# Patient Record
Sex: MALE | Race: WHITE | NOT HISPANIC OR LATINO | Employment: FULL TIME | URBAN - METROPOLITAN AREA
[De-identification: names, ages, dates, MRNs, and addresses within clinical notes are randomized per-mention and may not be internally consistent; named-entity substitution may affect disease eponyms.]

---

## 2017-01-04 DIAGNOSIS — G25.81 RESTLESS LEGS SYNDROME (RLS): Primary | ICD-10-CM

## 2017-01-06 RX ORDER — PRAMIPEXOLE DIHYDROCHLORIDE 0.5 MG/1
TABLET ORAL
Qty: 30 TABLET | Refills: 0 | Status: SHIPPED | OUTPATIENT
Start: 2017-01-06 | End: 2017-01-12

## 2017-01-07 NOTE — TELEPHONE ENCOUNTER
MP,  Left  #2 for pt  See below messages  Pt has not responded to our outreach.  Please advise on refill; pended 1 month supply  Rozina AVERY RN     Bladder non-tender and non-distended. Urine clear yellow.

## 2017-01-12 ENCOUNTER — OFFICE VISIT (OUTPATIENT)
Dept: FAMILY MEDICINE | Facility: CLINIC | Age: 57
End: 2017-01-12
Payer: COMMERCIAL

## 2017-01-12 VITALS
TEMPERATURE: 96.6 F | BODY MASS INDEX: 27.5 KG/M2 | WEIGHT: 203 LBS | HEIGHT: 72 IN | DIASTOLIC BLOOD PRESSURE: 69 MMHG | HEART RATE: 69 BPM | SYSTOLIC BLOOD PRESSURE: 111 MMHG | OXYGEN SATURATION: 96 %

## 2017-01-12 DIAGNOSIS — Z23 NEED FOR PROPHYLACTIC VACCINATION AND INOCULATION AGAINST INFLUENZA: ICD-10-CM

## 2017-01-12 DIAGNOSIS — G25.81 RESTLESS LEGS SYNDROME (RLS): Primary | ICD-10-CM

## 2017-01-12 DIAGNOSIS — Z23 NEED FOR PROPHYLACTIC VACCINATION WITH TETANUS-DIPHTHERIA (TD): ICD-10-CM

## 2017-01-12 DIAGNOSIS — Z11.59 NEED FOR HEPATITIS C SCREENING TEST: ICD-10-CM

## 2017-01-12 PROCEDURE — 90686 IIV4 VACC NO PRSV 0.5 ML IM: CPT | Performed by: FAMILY MEDICINE

## 2017-01-12 PROCEDURE — 90472 IMMUNIZATION ADMIN EACH ADD: CPT | Performed by: FAMILY MEDICINE

## 2017-01-12 PROCEDURE — 90471 IMMUNIZATION ADMIN: CPT | Performed by: FAMILY MEDICINE

## 2017-01-12 PROCEDURE — 36415 COLL VENOUS BLD VENIPUNCTURE: CPT | Performed by: FAMILY MEDICINE

## 2017-01-12 PROCEDURE — 90715 TDAP VACCINE 7 YRS/> IM: CPT | Performed by: FAMILY MEDICINE

## 2017-01-12 PROCEDURE — 82728 ASSAY OF FERRITIN: CPT | Performed by: FAMILY MEDICINE

## 2017-01-12 PROCEDURE — 99213 OFFICE O/P EST LOW 20 MIN: CPT | Mod: 25 | Performed by: FAMILY MEDICINE

## 2017-01-12 PROCEDURE — 86803 HEPATITIS C AB TEST: CPT | Performed by: FAMILY MEDICINE

## 2017-01-12 RX ORDER — PRAMIPEXOLE DIHYDROCHLORIDE 0.5 MG/1
0.5 TABLET ORAL AT BEDTIME
Qty: 90 TABLET | Refills: 3 | Status: SHIPPED | OUTPATIENT
Start: 2017-01-12 | End: 2017-04-27

## 2017-01-12 NOTE — NURSING NOTE
Chief Complaint   Patient presents with     Recheck Medication     pramipexole      /69 mmHg  Pulse 69  Temp(Src) 96.6  F (35.9  C) (Oral)  Ht 6' (1.829 m)  Wt 203 lb (92.08 kg)  BMI 27.53 kg/m2  SpO2 96% Estimated body mass index is 27.53 kg/(m^2) as calculated from the following:    Height as of this encounter: 6' (1.829 m).    Weight as of this encounter: 203 lb (92.08 kg).  BP completed using cuff size: large       Health Maintenance due pending provider review:  NONE    n/a      Omaira Rivas CMA

## 2017-01-12 NOTE — NURSING NOTE
Screening Questionnaire for Adult Immunization    Are you sick today?   No   Do you have allergies to medications, food, a vaccine component or latex?   No   Have you ever had a serious reaction after receiving a vaccination?   No   Do you have a long-term health problem with heart disease, lung disease, asthma, kidney disease, metabolic disease (e.g. diabetes), anemia, or other blood disorder?   No   Do you have cancer, leukemia, HIV/AIDS, or any other immune system problem?   No   In the past 3 months, have you taken medications that affect  your immune system, such as prednisone, other steroids, or anticancer drugs; drugs for the treatment of rheumatoid arthritis, Crohn s disease, or psoriasis; or have you had radiation treatments?   No   Have you had a seizure, or a brain or other nervous system problem?   No   During the past year, have you received a transfusion of blood or blood     products, or been given immune (gamma) globulin or antiviral drug?   No   For women: Are you pregnant or is there a chance you could become        pregnant during the next month?   No   Have you received any vaccinations in the past 4 weeks?   No     Immunization questionnaire answers were all negative.      MNVFC doesn't apply on this patient     Patient instructed to remain in clinic for 20 minutes afterwards, and to report any adverse reaction to me immediately.       Screening performed by Omaira Rivas on 1/12/2017 at 3:32 PM.

## 2017-01-12 NOTE — PROGRESS NOTES
Injectable Influenza Immunization Documentation    1.  Is the person to be vaccinated sick today?  No    2. Does the person to be vaccinated have an allergy to eggs or to a component of the vaccine?  No    3. Has the person to be vaccinated today ever had a serious reaction to influenza vaccine in the past?  No    4. Has the person to be vaccinated ever had Guillain-[place] syndrome?  No     Form completed by Omaira Rivas CMA

## 2017-01-12 NOTE — PROGRESS NOTES
Subjective: Annual review of medications,takes all whole Mirapex now but it's still quite small dose but without it he has terrible restless leg syndrome. He's never been checked for ferritin and we talked about the potential connection and we'll check it today and if it's low will try to bring it up to about 50 and see what it does for his symptoms, did warn him about potentially getting too high a level. He needs a tetanus shot and a flu shot and hepatitis C testing. No other concerns. We talked about healthy diets.    Assessment and plan: Follow-up restless leg syndrome, will check and see if there is a low iron connection. If not, medication refilled for a year and he should be seen in 1 year by one of my partners.

## 2017-01-13 LAB
FERRITIN SERPL-MCNC: 106 NG/ML (ref 26–388)
HCV AB SERPL QL IA: NORMAL

## 2017-01-13 NOTE — PROGRESS NOTES
Quick Note:    You have plenty of iron-that isn't the cause of your restless legs. If it was under 50 we would wonder if it was a factor  ______

## 2017-01-30 RX ORDER — PRAMIPEXOLE DIHYDROCHLORIDE 0.5 MG/1
TABLET ORAL
Start: 2017-01-30

## 2017-01-30 NOTE — TELEPHONE ENCOUNTER
Pending Prescriptions:                       Disp   Refills    pramipexole (MIRAPEX) 0.5 MG tablet [Phar*30 tab*0            Sig: TAKE 1 TABLET BY MOUTH AT BEDTIME       Rx to early.

## 2017-04-27 ENCOUNTER — OFFICE VISIT (OUTPATIENT)
Dept: FAMILY MEDICINE | Facility: CLINIC | Age: 57
End: 2017-04-27
Payer: COMMERCIAL

## 2017-04-27 VITALS
HEIGHT: 72 IN | BODY MASS INDEX: 27.5 KG/M2 | WEIGHT: 203 LBS | SYSTOLIC BLOOD PRESSURE: 114 MMHG | TEMPERATURE: 97.2 F | DIASTOLIC BLOOD PRESSURE: 68 MMHG | OXYGEN SATURATION: 96 % | HEART RATE: 70 BPM

## 2017-04-27 DIAGNOSIS — G25.81 RESTLESS LEGS SYNDROME (RLS): Primary | ICD-10-CM

## 2017-04-27 DIAGNOSIS — K21.9 GASTROESOPHAGEAL REFLUX DISEASE WITHOUT ESOPHAGITIS: ICD-10-CM

## 2017-04-27 DIAGNOSIS — Z12.11 COLON CANCER SCREENING: ICD-10-CM

## 2017-04-27 DIAGNOSIS — R19.5 DARK STOOLS: ICD-10-CM

## 2017-04-27 PROCEDURE — 99214 OFFICE O/P EST MOD 30 MIN: CPT | Performed by: FAMILY MEDICINE

## 2017-04-27 RX ORDER — PRAMIPEXOLE DIHYDROCHLORIDE 1 MG/1
1 TABLET ORAL
Qty: 90 TABLET | Refills: 3 | Status: SHIPPED | OUTPATIENT
Start: 2017-04-27 | End: 2019-02-01

## 2017-04-27 NOTE — MR AVS SNAPSHOT
After Visit Summary   4/27/2017    Luis Felipe Weaver    MRN: [id number]           Patient Information     Date Of Birth          1960        Visit Information        Provider Department      4/27/2017 10:00 AM Maxi Navarrete MD [facility]        Today's Diagnoses     Restless legs syndrome (RLS)    -  1    Dark stools        Colon cancer screening        Gastroesophageal reflux disease without esophagitis           Follow-ups after your visit        Follow-up notes from your care team     Return in about 1 year (around 4/27/2018), or if symptoms worsen or fail to improve.      Future tests that were ordered for you today     Open Future Orders        Priority Expected Expires Ordered    Fecal colorectal cancer screen (FIT) Routine 5/18/2017 7/20/2017 4/27/2017            Who to contact     If you have questions or need follow up information about today's clinic visit or your schedule please contact [facility] directly at [phone].  Normal or non-critical lab and imaging results will be communicated to you by [employer], letter or phone within 4 business days after the clinic has received the results. If you do not hear from us within 7 days, please contact the clinic through [employer] or phone. If you have a critical or abnormal lab result, we will notify you by phone as soon as possible.  Submit refill requests through [employer] or call your pharmacy and they will forward the refill request to us. Please allow 3 business days for your refill to be completed.          Additional Information About Your Visit        MyChart Information     [employer] gives you secure access to your electronic health record. If you see a primary care provider, you can also send messages to your care team and make appointments. If you have questions, please call your primary care clinic.  If you do not have a primary care provider, please call [phone] and they will assist you.         Care EveryWhere ID     This is your Care EveryWhere ID. This could be used by other organizations to access your [facility] medical records  [id number]        Your Vitals Were     Pulse Temperature Height Pulse Oximetry BMI (Body Mass Index)       70 97.2  F (36.2  C) (Oral) 6' (1.829 m) 96% 27.53 kg/m2        Blood Pressure from Last 3 Encounters:   04/27/17 114/68   01/12/17 111/69   06/23/16 127/64    Weight from Last 3 Encounters:   04/27/17 203 lb (92.1 kg)   01/12/17 203 lb (92.1 kg)   06/23/16 198 lb (89.8 kg)                 Today's Medication Changes          These changes are accurate as of: 4/27/17 11:37 AM.  If you have any questions, ask your nurse or doctor.               These medicines have changed or have updated prescriptions.        Dose/Directions    pramipexole 1 MG tablet   Commonly known as:  MIRAPEX   This may have changed:    - medication strength  - how much to take  - when to take this  - reasons to take this   Used for:  Restless legs syndrome (RLS)   Changed by:  Maxi Navarrete MD        Dose:  1 mg   Take 1 tablet (1 mg) by mouth nightly as needed   Quantity:  90 tablet   Refills:  3            Where to get your medicines      These medications were sent to [email or web address] - [address]  [address], [place] [address]     Phone:  [phone]     pramipexole 1 MG tablet                Primary Care Provider Office Phone # Fax #    Herman Raymundo [phone]       [facility] [address] [address]        Thank you!     Thank you for choosing [facility]  for your care. Our goal is always to provide you with excellent care. Hearing back from our patients is one way we can continue to improve our services. Please take a few minutes to complete the written survey that you may receive in the mail after your visit with us. Thank you!             Your Updated  Medication List - Protect others around you: Learn how to safely use, store and throw away your medicines at www.disposemymeds.org.          This list is accurate as of: 4/27/17 11:37 AM.  Always use your most recent med list.                   Brand Name Dispense Instructions for use    pramipexole 1 MG tablet    MIRAPEX    90 tablet    Take 1 tablet (1 mg) by mouth nightly as needed       UNKNOWN MED DOSAGE      Topical psoriasis medication

## 2017-04-27 NOTE — PROGRESS NOTES
SUBJECTIVE:                                                    Luis Felipe Weaver is a 57 year old male who presents to clinic today for the following health issues:      Medication Followup of   pramipexole (MIRAPEX)     Taking Medication as prescribed: yes    Side Effects:  no    Medication Helping Symptoms:  NO- not helping sx as much as used to - would like discuss increase dosage      Patient has been taking this medication for restless leg syndrome for quite a while  He has been sometimes doubling up to 1 mg per evening and this seems to work quite a bit better  We discussed the potential side effects from using the higher doses of Mirapex but he doesn't seem to be having any of those  We also discussed using at 2-3 hours before symptom onset as the best way    He did have an evaluation to check his ferritin but this was normal    Gastrointestinal symptoms      Duration: ongoing    Description:           REFLUX SYMPTOMS - heartburn at night and dark stools     Intensity:  Severe - at times     Accompanying signs and symptoms:  none    History  Previous {similar problem: YES  Previous evaluation:  none    Aggravating factors: none, certain foods - garlic and red sauce, peanut butter      Alleviating factors: nothing    Other Therapies tried: antacid occ if sever - usually sits up in bed a and sx usually passed       He's also been eating beets and juice and wonders if that's a possibility  Patient has previously had normal colon cancer screening but it's been more than 5 years  We will do some screening with a fit test      ROS: As per HPI.  Constitutional: no recent illness, no fevers/sweats/chills  Neuro: no new headaches, dizziness, numbness, or tingling    I have reviewed and updated the patient's past medical history in the EMR. Current problems are:    Patient Active Problem List   Diagnosis     Restless legs syndrome (RLS)     GERD (gastroesophageal reflux disease)     Psoriasis     Narcolepsy without  cataplexy     Achalasia     Pain in thoracic spine     Past Surgical History:   Procedure Laterality Date     NO         Social History   Substance Use Topics     Smoking status: Never Smoker     Smokeless tobacco: Never Used     Alcohol use No     Family History   Problem Relation Age of Onset     DIABETES Mother      Hypertension Mother      Obesity Mother      C.A.D. No family hx of      Cancer - colorectal No family hx of          Allergies, reviewed:     Allergies   Allergen Reactions     Provigil [Modafinil] Rash       Current Outpatient Prescriptions   Medication Sig Dispense Refill     pramipexole (MIRAPEX) 1 MG tablet Take 1 tablet (1 mg) by mouth nightly as needed 90 tablet 3     [DISCONTINUED] pramipexole (MIRAPEX) 0.5 MG tablet Take 1 tablet (0.5 mg) by mouth At Bedtime 90 tablet 3     UNKNOWN MED DOSAGE Topical psoriasis medication         Objective:  /68  Pulse 70  Temp 97.2  F (36.2  C) (Oral)  Ht 6' (1.829 m)  Wt 203 lb (92.1 kg)  SpO2 96%  BMI 27.53 kg/m2  General Appearance: Pleasant, alert, WN/WD in no acute respiratory distress.  Neurologic Exam: Nonfocal, no tremor. Normal gait.  Psychiatric Exam: Alert - appropriate, normal affect    ASSESSMENT/PLAN:    ICD-10-CM    1. Restless legs syndrome (RLS) G25.81 pramipexole (MIRAPEX) 1 MG tablet   2. Dark stools R19.5    3. Colon cancer screening Z12.11 Fecal colorectal cancer screen (FIT)   4. Gastroesophageal reflux disease without esophagitis K21.9     increase Mirapex as noted above   Dark stools may be dietary  Fit test for colon cancer screening    The pathophysiology of reflux is discussed.  Anti-reflux measures such as raising the head of the bed, avoiding tight clothing or belts, avoiding eating late at night and not lying down shortly after mealtime and achieving weight loss are discussed.  If still concerned about the potential effects of omeprazole could try switching to H2 blockers, which work differently and don't have a  concern about kidney function  The most common brands of H2 blockers are Zantac, Pepcid, and Tagamet  However my experience is that they don't work quite as well    Follow up: Return as needed if symptoms fail to improve  Or in one year as needed for refill of restless leg syndrome medication    Maxi Navarrete MD MPH

## 2017-04-27 NOTE — NURSING NOTE
Chief Complaint   Patient presents with     RECHECK     Gastrophageal Reflux     /68  Pulse 70  Temp 97.2  F (36.2  C) (Oral)  Ht 6' (1.829 m)  Wt 203 lb (92.1 kg)  SpO2 96%  BMI 27.53 kg/m2 Estimated body mass index is 27.53 kg/(m^2) as calculated from the following:    Height as of this encounter: 6' (1.829 m).    Weight as of this encounter: 203 lb (92.1 kg).  BP completed using cuff size: regular       Health Maintenance due pending provider review:  NONE    n/a      Omaira Rivas CMA

## 2018-02-12 ENCOUNTER — OFFICE VISIT (OUTPATIENT)
Dept: FAMILY MEDICINE | Facility: CLINIC | Age: 58
End: 2018-02-12
Payer: COMMERCIAL

## 2018-02-12 VITALS
SYSTOLIC BLOOD PRESSURE: 104 MMHG | HEART RATE: 63 BPM | TEMPERATURE: 96.9 F | HEIGHT: 72 IN | WEIGHT: 206 LBS | DIASTOLIC BLOOD PRESSURE: 66 MMHG | OXYGEN SATURATION: 97 % | RESPIRATION RATE: 14 BRPM | BODY MASS INDEX: 27.9 KG/M2

## 2018-02-12 DIAGNOSIS — Z00.00 ROUTINE HISTORY AND PHYSICAL EXAMINATION OF ADULT: Primary | ICD-10-CM

## 2018-02-12 DIAGNOSIS — Z23 NEED FOR INFLUENZA VACCINATION: ICD-10-CM

## 2018-02-12 DIAGNOSIS — K22.0 ACHALASIA: ICD-10-CM

## 2018-02-12 DIAGNOSIS — G25.81 RESTLESS LEGS SYNDROME (RLS): ICD-10-CM

## 2018-02-12 DIAGNOSIS — K21.9 GASTROESOPHAGEAL REFLUX DISEASE WITHOUT ESOPHAGITIS: ICD-10-CM

## 2018-02-12 LAB
ERYTHROCYTE [DISTWIDTH] IN BLOOD BY AUTOMATED COUNT: 13 % (ref 10–15)
HCT VFR BLD AUTO: 43.9 % (ref 40–53)
HGB BLD-MCNC: 15 G/DL (ref 13.3–17.7)
MCH RBC QN AUTO: 30.4 PG (ref 26.5–33)
MCHC RBC AUTO-ENTMCNC: 34.2 G/DL (ref 31.5–36.5)
MCV RBC AUTO: 89 FL (ref 78–100)
PLATELET # BLD AUTO: 199 10E9/L (ref 150–450)
RBC # BLD AUTO: 4.93 10E12/L (ref 4.4–5.9)
WBC # BLD AUTO: 6 10E9/L (ref 4–11)

## 2018-02-12 PROCEDURE — 99396 PREV VISIT EST AGE 40-64: CPT | Mod: 25 | Performed by: FAMILY MEDICINE

## 2018-02-12 PROCEDURE — 82728 ASSAY OF FERRITIN: CPT | Performed by: FAMILY MEDICINE

## 2018-02-12 PROCEDURE — 90471 IMMUNIZATION ADMIN: CPT | Performed by: FAMILY MEDICINE

## 2018-02-12 PROCEDURE — 36415 COLL VENOUS BLD VENIPUNCTURE: CPT | Performed by: FAMILY MEDICINE

## 2018-02-12 PROCEDURE — 80061 LIPID PANEL: CPT | Performed by: FAMILY MEDICINE

## 2018-02-12 PROCEDURE — 90686 IIV4 VACC NO PRSV 0.5 ML IM: CPT | Performed by: FAMILY MEDICINE

## 2018-02-12 PROCEDURE — 85027 COMPLETE CBC AUTOMATED: CPT | Performed by: FAMILY MEDICINE

## 2018-02-12 PROCEDURE — 99213 OFFICE O/P EST LOW 20 MIN: CPT | Mod: 25 | Performed by: FAMILY MEDICINE

## 2018-02-12 RX ORDER — ROPINIROLE 0.5 MG/1
0.5 TABLET, FILM COATED ORAL AT BEDTIME
Qty: 90 TABLET | Refills: 3 | Status: SHIPPED | OUTPATIENT
Start: 2018-02-12 | End: 2018-03-02

## 2018-02-12 NOTE — NURSING NOTE
Chief Complaint   Patient presents with     Physical       Initial /66  Pulse 63  Temp 96.9  F (36.1  C) (Tympanic)  Resp 14  Ht 6' (1.829 m)  Wt 206 lb (93.4 kg)  SpO2 97%  BMI 27.94 kg/m2 Estimated body mass index is 27.94 kg/(m^2) as calculated from the following:    Height as of this encounter: 6' (1.829 m).    Weight as of this encounter: 206 lb (93.4 kg).  BP completed using cuff size: large    Health Maintenance that is potentially due pending provider review:  Health Maintenance Due   Topic Date Due     ADVANCE DIRECTIVE PLANNING Q5 YRS  01/06/2017     INFLUENZA VACCINE (SYSTEM ASSIGNED)  09/01/2017         Flu vaccine ordered

## 2018-02-12 NOTE — PROGRESS NOTES
SUBJECTIVE:   CC: Luis Felipe Weaver is an 58 year old male who presents for preventative health visit.     Physical   Annual:     Getting at least 3 servings of Calcium per day::  Yes    Bi-annual eye exam::  Yes    Dental care twice a year::  Yes    Sleep apnea or symptoms of sleep apnea::  Daytime drowsiness and Excessive snoring    Diet::  Regular (no restrictions)    Frequency of exercise::  None    Taking medications regularly::  No    Barriers to taking medications::  Side effects    Medication side effects::  Other    Additional concerns today::  YES              in addition to health maintanance patient would like to discuss the following problem:    Recently has been having more Heartburn, Difficulty swallowing, over the last couple years.   fullness, belching, burning after heavier meals, especially if lying down shortly after eating. + dysphagia.     Exam shows no abdominal tenderness or mass. This is intermittent GERD. The pathophysiology of reflux is discussed; anti-reflux measures such as raising the head of the bed, and avoiding lying down after meals are suggested.     Try to avoid ASA, NSAID's, caffeine, peppermints, alcohol and tobacco. OTC   PPI and H2 blockers and/or antacids are often very helpful for PRN use.     Because of the persistent dysphagia I recommend that patient get an upper GI endoscopy for evaluation    Also a persistent cough.   Went on a camping trip around [facility] and started getting coughing with deep breathing from that, but it seems to be better now    Exam shows normal ENT, normal chest. This is likely a post viral cough. Asthma or reflux are a consideration if symptoms persist. I reviewed the 5 most common causes of chronic cough and their treatments    We also discussed restless legs patient previously was taking Mirapex 1 mg but finds that he has some stomach side effects with this  Takes it about 1 hour before bedtime  He has no trouble falling asleep but he wakes  up with leg symptoms  He wonders if another drug would be better  He is already tried gabapentin and this was ineffective     Review of systems no significant fatigue or anemia  Reviewed previous labs showing normal ferritin    Problem pertinent exam negative for neurologic signs and symptoms    Discussed taking Mirapex earlier in the evening up to 2-3 hours before bedtime to see if this helps with the gastrointestinal side effects  Otherwise also a trial of Requip as an alternative    Follow up: Return as needed if symptoms fail to improve        We also discussed health maintenance, health care directive, flu shot.      Today's PHQ-2 Score:   PHQ-2 ( 1999 Pfizer) 2/8/2018   Q1: Little interest or pleasure in doing things 1   Q2: Feeling down, depressed or hopeless 1   PHQ-2 Score 2   Q1: Little interest or pleasure in doing things Several days   Q2: Feeling down, depressed or hopeless Several days   PHQ-2 Score 2       Abuse: Current or Past(Physical, Sexual or Emotional)- No  Do you feel safe in your environment - Yes    Social History   Substance Use Topics     Smoking status: Never Smoker     Smokeless tobacco: Never Used     Alcohol use No     Alcohol Use 2/8/2018   If you drink alcohol, do you typically have greater than 3 drinks per day OR greater than 7 drinks per week?   Not applicable   No flowsheet data found.    Last PSA: No results found for: PSA    Reviewed orders with patient. Reviewed health maintenance and updated orders accordingly - Yes  BP Readings from Last 3 Encounters:   02/12/18 104/66   04/27/17 114/68   01/12/17 111/69    Wt Readings from Last 3 Encounters:   02/12/18 206 lb (93.4 kg)   04/27/17 203 lb (92.1 kg)   01/12/17 203 lb (92.1 kg)                  Patient Active Problem List   Diagnosis     Restless legs syndrome (RLS)     GERD (gastroesophageal reflux disease)     Psoriasis     Narcolepsy without cataplexy(347.00)     Achalasia     Pain in thoracic spine     Past Surgical  History:   Procedure Laterality Date     NO         Social History   Substance Use Topics     Smoking status: Never Smoker     Smokeless tobacco: Never Used     Alcohol use No     Family History   Problem Relation Age of Onset     DIABETES Mother      Hypertension Mother      Obesity Mother      C.A.D. No family hx of      Cancer - colorectal No family hx of          Current Outpatient Prescriptions   Medication Sig Dispense Refill     rOPINIRole (REQUIP) 0.5 MG tablet Take 1 tablet (0.5 mg) by mouth At Bedtime 90 tablet 3     pramipexole (MIRAPEX) 1 MG tablet Take 1 tablet (1 mg) by mouth nightly as needed 90 tablet 3     UNKNOWN MED DOSAGE Topical psoriasis medication       Allergies   Allergen Reactions     Provigil [Modafinil] Rash       Reviewed and updated as needed this visit by clinical staff         Reviewed and updated as needed this visit by Provider            Review of Systems  Except as noted above    Constitutional: no recent illness, no fevers/sweats/chills  Eyes: No vision changes or eye irritation  Ears/Nose/Throat: No runny nose, sore throat or ear pain  CV: no palpitations, no chest pain, no lower extremity swelling.  Resp: no shortness of breath, wheezing, or cough.  GI: no nausea/vomiting/diarrhea, no black or bloody stools  : no burning or urgency with urination  Skin: no rash  Musculoskeletal: no joint pain, muscle pain, weakness  Psych: no depression, no concerns about anxiety  Neuro: no new headaches, dizziness, numbness, or tingling      OBJECTIVE:   /66  Pulse 63  Temp 96.9  F (36.1  C) (Tympanic)  Resp 14  Ht 6' (1.829 m)  Wt 206 lb (93.4 kg)  SpO2 97%  BMI 27.94 kg/m2    Physical Exam    General Appearance: Pleasant, alert, in no acute respiratory distress.  Head Exam: Normal. Normocephalic, atraumatic. No sinus tenderness.  Eye Exam: Normal external eye, conjunctiva, lids. ANTHONY.  Ear Exam: Normal auditory canals and external ears. Non-tender.  Left TM-normal. Right  TM-normal.  OroPharynx Exam: Dental hygiene adequate. Normal buccal mucosa. Normal pharynx.  Neck Exam: Supple, no masses or enlarged, tender nodes.  Thyroid Exam: No nodules or enlargement or tenderness.  Chest/Respiratory Exam: Normal, comfortable, easy respirations. Chest wall normal. Lungs are clear to auscultation. No wheezing, crackles, or rhonchi.  Cardiovascular Exam: Regular rate and rhythm. No murmur, gallop, or rubs. No pedal edema.  Gastrointestinal Exam: Soft, non-tender, no masses or organomegaly.  Musculoskeletal Exam: Back is non-tender, full ROM of upper and lower extremities.  Skin: no rash, warm and dry.  Neurologic Exam: Nonfocal, no tremor. Normal gait.  Psychiatric Exam: Alert - appropriate, normal affect      ASSESSMENT/PLAN:       ICD-10-CM    1. Routine history and physical examination of adult Z00.00 Lipid panel reflex to direct LDL Fasting   2. Achalasia K22.0 GASTROENTEROLOGY ADULT REF PROCEDURE ONLY [facility]  [phone]; Dr. VINNIE Hall   3. Gastroesophageal reflux disease without esophagitis K21.9 GASTROENTEROLOGY ADULT REF PROCEDURE ONLY [facility]  [phone]; Dr. VINNIE Hall   4. Need for influenza vaccination Z23 HC FLU VAC PRESRV FREE QUAD SPLIT VIR 3+YRS IM   5. Restless legs syndrome (RLS) G25.81 rOPINIRole (REQUIP) 0.5 MG tablet     CBC with platelets     Ferritin       COUNSELING:   Reviewed preventive health counseling, as reflected in patient instructions       Regular exercise       Healthy diet/nutrition       Colon cancer screening       Prostate cancer screening  Discussed the risks and benefits of prostate cancer screening via PSA, current recommendation from the USPSTF is again screening  But this could be modified by individual risk or family history  Patient declined screening today         reports that he has never smoked. He has never used smokeless tobacco.    Estimated body mass index is 27.53 kg/(m^2) as calculated from the following:     Height as of 4/27/17: 6' (1.829 m).    Weight as of 4/27/17: 203 lb (92.1 kg).       Wt Readings from Last 4 Encounters:   02/12/18 206 lb (93.4 kg)   04/27/17 203 lb (92.1 kg)   01/12/17 203 lb (92.1 kg)   06/23/16 198 lb (89.8 kg)       Counseling Resources:  ATP IV Guidelines  Pooled Cohorts Equation Calculator  FRAX Risk Assessment  ICSI Preventive Guidelines  Dietary Guidelines for Americans, 2010  USDA's MyPlate  ASA Prophylaxis  Lung CA Screening    Maxi Pipo Navarrete MD  [facility]  Answers for HPI/ROS submitted by the patient on 2/8/2018   PHQ-2 Score: 2

## 2018-02-12 NOTE — MR AVS SNAPSHOT
After Visit Summary   2/12/2018    Luis Felipe Weaver    MRN: [id number]           Patient Information     Date Of Birth          1960        Visit Information        Provider Department      2/12/2018 12:30 PM Maxi Navarrete MD [facility]        Today's Diagnoses     Routine history and physical examination of adult    -  1    Achalasia        Gastroesophageal reflux disease without esophagitis        Need for influenza vaccination        Restless legs syndrome (RLS)          Care Instructions      Preventive Health Recommendations  Male Ages 50 - 64    Yearly exam:             See your health care provider every year in order to  o   Review health changes.   o   Discuss preventive care.    o   Review your medicines if your doctor has prescribed any.     Have a cholesterol test every 5 years, or more frequently if you are at risk for high cholesterol/heart disease.     Have a diabetes test (fasting glucose) every three years. If you are at risk for diabetes, you should have this test more often.     Have a colonoscopy at age 50, or have a yearly FIT test (stool test). These exams will check for colon cancer.      Talk with your health care provider about whether or not a prostate cancer screening test (PSA) is right for you.    You should be tested each year for STDs (sexually transmitted diseases), if you re at risk.     Shots: Get a flu shot each year. Get a tetanus shot every 10 years.     Nutrition:    Eat at least 5 servings of fruits and vegetables daily.     Eat whole-grain bread, whole-wheat pasta and brown rice instead of white grains and rice.     Talk to your provider about Calcium and Vitamin D.     Lifestyle    Exercise for at least 150 minutes a week (30 minutes a day, 5 days a week). This will help you control your weight and prevent disease.     Limit alcohol to one drink per day.     No smoking.     Wear sunscreen to prevent skin cancer.     See your dentist  every six months for an exam and cleaning.     See your eye doctor every 1 to 2 years.            Follow-ups after your visit        Additional Services     GASTROENTEROLOGY ADULT REF PROCEDURE ONLY Rena Gordon [phone]; Dr. VINNIE Hall       Last Lab Result: No results found for: CR  Body mass index is 27.94 kg/(m^2).      Patient will be contacted to schedule procedure.     Please be aware that coverage of these services is subject to the terms and limitations of your health insurance plan.  Call member services at your health plan with any benefit or coverage questions.  Any procedures must be performed at a [facility] facility OR coordinated by your clinic's referral office.    Please bring the following with you to your appointment:    (1) Any X-Rays, CTs or MRIs which have been performed.  Contact the facility where they were done to arrange for  prior to your scheduled appointment.    (2) List of current medications   (3) This referral request   (4) Any documents/labs given to you for this referral                  Who to contact     If you have questions or need follow up information about today's clinic visit or your schedule please contact [facility] directly at [phone].  Normal or non-critical lab and imaging results will be communicated to you by MyChart, letter or phone within 4 business days after the clinic has received the results. If you do not hear from us within 7 days, please contact the clinic through [employer]oSys[employer] or phone. If you have a critical or abnormal lab result, we will notify you by phone as soon as possible.  Submit refill requests through [employer] or call your pharmacy and they will forward the refill request to us. Please allow 3 business days for your refill to be completed.          Additional Information About Your Visit        MorphoS[employer]hart Information     [employer] gives you secure access to your electronic health record. If you see a primary care provider,  you can also send messages to your care team and make appointments. If you have questions, please call your primary care clinic.  If you do not have a primary care provider, please call [phone] and they will assist you.        Care EveryWhere ID     This is your Care EveryWhere ID. This could be used by other organizations to access your [facility] medical records  [id number]        Your Vitals Were     Pulse Temperature Respirations Height Pulse Oximetry BMI (Body Mass Index)    63 96.9  F (36.1  C) (Tympanic) 14 6' (1.829 m) 97% 27.94 kg/m2       Blood Pressure from Last 3 Encounters:   02/12/18 104/66   04/27/17 114/68   01/12/17 111/69    Weight from Last 3 Encounters:   02/12/18 206 lb (93.4 kg)   04/27/17 203 lb (92.1 kg)   01/12/17 203 lb (92.1 kg)              We Performed the Following     CBC with platelets     Ferritin     GASTROENTEROLOGY ADULT REF PROCEDURE ONLY Rena Gordon [phone]; Dr. VINNIE Hall     HC FLU VAC PRESRV FREE QUAD SPLIT VIR 3+YRS IM     Lipid panel reflex to direct LDL Fasting     OFFICE/OUTPT VISIT,EST,LEVL III          Today's Medication Changes          These changes are accurate as of 2/12/18  4:29 PM.  If you have any questions, ask your nurse or doctor.               Start taking these medicines.        Dose/Directions    rOPINIRole 0.5 MG tablet   Commonly known as:  REQUIP   Used for:  Restless legs syndrome (RLS)   Started by:  Maxi Navarrete MD        Dose:  0.5 mg   Take 1 tablet (0.5 mg) by mouth At Bedtime   Quantity:  90 tablet   Refills:  3            Where to get your medicines      These medications were sent to [facility] filled by [facility] [place] - [address]  [address], [place] [address]     Phone:  [phone]     rOPINIRole 0.5 MG tablet                Primary Care Provider Office Phone # Fax #    Maxi Navarrete [phone] [address] [facility] [address]         Equal Access to Services     [facility] : Hadii aad ku hadrosa elenaleon Aliciarosalind, wabritda luqadaha, qaybta kapearldutch quiles. So [facility] [phone].    ATENCIÓN: Si habla español, tiene a recinos disposición servicios gratuitos de asistencia lingüística. Llame al [phone].    We comply with applicable federal civil rights laws and [place] laws. We do not discriminate on the basis of race, color, national origin, age, disability, sex, sexual orientation, or gender identity.            Thank you!     Thank you for choosing [facility]  for your care. Our goal is always to provide you with excellent care. Hearing back from our patients is one way we can continue to improve our services. Please take a few minutes to complete the written survey that you may receive in the mail after your visit with us. Thank you!             Your Updated Medication List - Protect others around you: Learn how to safely use, store and throw away your medicines at www.disposemymeds.org.          This list is accurate as of 2/12/18  4:29 PM.  Always use your most recent med list.                   Brand Name Dispense Instructions for use Diagnosis    pramipexole 1 MG tablet    MIRAPEX    90 tablet    Take 1 tablet (1 mg) by mouth nightly as needed    Restless legs syndrome (RLS)       rOPINIRole 0.5 MG tablet    REQUIP    90 tablet    Take 1 tablet (0.5 mg) by mouth At Bedtime    Restless legs syndrome (RLS)       UNKNOWN MED DOSAGE      Topical psoriasis medication

## 2018-02-13 LAB
CHOLEST SERPL-MCNC: 240 MG/DL
FERRITIN SERPL-MCNC: 93 NG/ML (ref 26–388)
HDLC SERPL-MCNC: 55 MG/DL
LDLC SERPL CALC-MCNC: 150 MG/DL
NONHDLC SERPL-MCNC: 185 MG/DL
TRIGL SERPL-MCNC: 174 MG/DL

## 2018-03-02 ENCOUNTER — OFFICE VISIT (OUTPATIENT)
Dept: FAMILY MEDICINE | Facility: CLINIC | Age: 58
End: 2018-03-02
Payer: COMMERCIAL

## 2018-03-02 VITALS
SYSTOLIC BLOOD PRESSURE: 116 MMHG | HEART RATE: 85 BPM | TEMPERATURE: 98.2 F | HEIGHT: 72 IN | BODY MASS INDEX: 27.43 KG/M2 | DIASTOLIC BLOOD PRESSURE: 68 MMHG | RESPIRATION RATE: 16 BRPM | OXYGEN SATURATION: 100 % | WEIGHT: 202.5 LBS

## 2018-03-02 DIAGNOSIS — G25.81 RESTLESS LEGS SYNDROME (RLS): ICD-10-CM

## 2018-03-02 DIAGNOSIS — R05.8 POST-VIRAL COUGH SYNDROME: Primary | ICD-10-CM

## 2018-03-02 PROCEDURE — 99214 OFFICE O/P EST MOD 30 MIN: CPT | Performed by: FAMILY MEDICINE

## 2018-03-02 RX ORDER — ROPINIROLE 1 MG/1
1 TABLET, FILM COATED ORAL AT BEDTIME
Qty: 30 TABLET | Refills: 11 | Status: SHIPPED | OUTPATIENT
Start: 2018-03-02 | End: 2018-03-12

## 2018-03-02 NOTE — NURSING NOTE
Chief Complaint   Patient presents with     URI     Breathing concerns- pt states that he has a coughing sensation when taking deep breaths.       Initial /68  Pulse 85  Temp 98.2  F (36.8  C) (Oral)  Resp 16  Ht 6' (1.829 m)  Wt 202 lb 8 oz (91.9 kg)  SpO2 100%  BMI 27.46 kg/m2 Estimated body mass index is 27.46 kg/(m^2) as calculated from the following:    Height as of this encounter: 6' (1.829 m).    Weight as of this encounter: 202 lb 8 oz (91.9 kg).  Medication Reconciliation: complete    Health Maintenance Due Pending Provider Review:  NONE    n/a    Cathie Izaguirre MA  [facility]

## 2018-03-02 NOTE — MR AVS SNAPSHOT
After Visit Summary   3/2/2018    Luis Felipe Weaver    MRN: [id number]           Patient Information     Date Of Birth          1960        Visit Information        Provider Department      3/2/2018 3:00 PM Maxi Navarrete MD [facility]        Today's Diagnoses     Post-viral cough syndrome    -  1    Restless legs syndrome (RLS)           Follow-ups after your visit        Follow-up notes from your care team     Return if symptoms worsen or fail to improve.      Your next 10 appointments already scheduled     Mar 08, 2018   Procedure with Jose Alejandro Hall MD   [facility] ([facility])    [address]  [place] [address] [phone]           [facility] is located at [address] [place]              Who to contact     If you have questions or need follow up information about today's clinic visit or your schedule please contact [facility] directly at [phone].  Normal or non-critical lab and imaging results will be communicated to you by Backchathart, letter or phone within 4 business days after the clinic has received the results. If you do not hear from us within 7 days, please contact the clinic through Backchathart or phone. If you have a critical or abnormal lab result, we will notify you by phone as soon as possible.  Submit refill requests through [employer] or call your pharmacy and they will forward the refill request to us. Please allow 3 business days for your refill to be completed.          Additional Information About Your Visit        MyChart Information     [employer] gives you secure access to your electronic health record. If you see a primary care provider, you can also send messages to your care team and make appointments. If you have questions, please call your primary care clinic.  If you do not have a primary care provider, please call [phone] and they will assist you.        Care  EveryWhere ID     This is your Care EveryWhere ID. This could be used by other organizations to access your [facility] medical records  [id number]        Your Vitals Were     Pulse Temperature Respirations Height Pulse Oximetry BMI (Body Mass Index)    85 98.2  F (36.8  C) (Oral) 16 6' (1.829 m) 100% 27.46 kg/m2       Blood Pressure from Last 3 Encounters:   03/02/18 116/68   02/12/18 104/66   04/27/17 114/68    Weight from Last 3 Encounters:   03/02/18 202 lb 8 oz (91.9 kg)   02/12/18 206 lb (93.4 kg)   04/27/17 203 lb (92.1 kg)              Today, you had the following     No orders found for display         Today's Medication Changes          These changes are accurate as of 3/2/18  4:24 PM.  If you have any questions, ask your nurse or doctor.               Start taking these medicines.        Dose/Directions    mometasone 110 MCG/INH inhaler   Commonly known as:  ASMANEX 30 METERED DOSES   Used for:  Post-viral cough syndrome   Started by:  Maxi Navarrete MD        Dose:  1 puff   Inhale 1 puff into the lungs daily   Quantity:  1 Inhaler   Refills:  1         These medicines have changed or have updated prescriptions.        Dose/Directions    rOPINIRole 1 MG tablet   Commonly known as:  REQUIP   This may have changed:    - medication strength  - how much to take   Used for:  Restless legs syndrome (RLS)   Changed by:  Maxi Navarrete MD        Dose:  1 mg   Take 1 tablet (1 mg) by mouth At Bedtime   Quantity:  30 tablet   Refills:  11            Where to get your medicines      These medications were sent to [facility] filled by [facility] - [place], [place] - [address]  [address], [place] [address]     Phone:  [phone]     mometasone 110 MCG/INH inhaler    rOPINIRole 1 MG tablet                Primary Care Provider Office Phone # Fax #    Maxi Navarrete [phone] [address] [address]        Equal Access to  Services     [facility]: Hadii aad ku hadrosa elenaleon Sorosalind, waaxda luqadaha, qaybta kaalmada jacob, dutch gutiérrez . So [facility] [phone].    ATENCIÓN: Si habla esphelen, tiene a recinos disposición servicios gratuitos de asistencia lingüística. Llame al [phone].    We comply with applicable federal civil rights laws and [place] laws. We do not discriminate on the basis of race, color, national origin, age, disability, sex, sexual orientation, or gender identity.            Thank you!     Thank you for choosing [facility]  for your care. Our goal is always to provide you with excellent care. Hearing back from our patients is one way we can continue to improve our services. Please take a few minutes to complete the written survey that you may receive in the mail after your visit with us. Thank you!             Your Updated Medication List - Protect others around you: Learn how to safely use, store and throw away your medicines at www.disposemymeds.org.          This list is accurate as of 3/2/18  4:24 PM.  Always use your most recent med list.                   Brand Name Dispense Instructions for use Diagnosis    mometasone 110 MCG/INH inhaler    ASMANEX 30 METERED DOSES    1 Inhaler    Inhale 1 puff into the lungs daily    Post-viral cough syndrome       pramipexole 1 MG tablet    MIRAPEX    90 tablet    Take 1 tablet (1 mg) by mouth nightly as needed    Restless legs syndrome (RLS)       rOPINIRole 1 MG tablet    REQUIP    30 tablet    Take 1 tablet (1 mg) by mouth At Bedtime    Restless legs syndrome (RLS)       UNKNOWN MED DOSAGE      Topical psoriasis medication

## 2018-03-02 NOTE — PROGRESS NOTES
SUBJECTIVE:   Luis Felipe Weaver is a 58 year old male who presents to clinic today for the following health issues:      BREATHING CONCERNS      Duration: off and on for 3 months    Description (location/character/radiation): chest, cough    Intensity:  mild    Accompanying signs and symptoms: none    History (similar episodes/previous evaluation): started with a bad flu earlier this year    Precipitating or alleviating factors: None    Therapies tried and outcome: OTC     Also has restless leg syndrome and he was getting some nausea with his Mirapex so we tried switching him to Requip he thinks this is going okay but he feels like the dose could be better    ROS: As per HPI.  Constitutional: no fevers/sweats/chills  CV: no palpitations, no chest pain, no lower extremity swelling.  Resp: no shortness of breath, wheezing, or cough.      I have reviewed and updated the patient's past medical history in the EMR. Current problems are:    Patient Active Problem List   Diagnosis     Restless legs syndrome (RLS)     GERD (gastroesophageal reflux disease)     Psoriasis     Narcolepsy without cataplexy(347.00)     Achalasia     Pain in thoracic spine     Past Surgical History:   Procedure Laterality Date     NO         Social History   Substance Use Topics     Smoking status: Never Smoker     Smokeless tobacco: Never Used     Alcohol use No     Family History   Problem Relation Age of Onset     DIABETES Mother      Hypertension Mother      Obesity Mother      C.A.D. No family hx of      Cancer - colorectal No family hx of          Allergies, reviewed:     Allergies   Allergen Reactions     Provigil [Modafinil] Rash       Current Outpatient Prescriptions   Medication Sig Dispense Refill     mometasone (ASMANEX 30 METERED DOSES) 110 MCG/INH inhaler Inhale 1 puff into the lungs daily 1 Inhaler 1     rOPINIRole (REQUIP) 1 MG tablet Take 1 tablet (1 mg) by mouth At Bedtime 30 tablet 11     pramipexole (MIRAPEX) 1 MG tablet Take  1 tablet (1 mg) by mouth nightly as needed 90 tablet 3     UNKNOWN MED DOSAGE Topical psoriasis medication       [DISCONTINUED] rOPINIRole (REQUIP) 0.5 MG tablet Take 1 tablet (0.5 mg) by mouth At Bedtime 90 tablet 3       Objective:  /68  Pulse 85  Temp 98.2  F (36.8  C) (Oral)  Resp 16  Ht 6' (1.829 m)  Wt 202 lb 8 oz (91.9 kg)  SpO2 100%  BMI 27.46 kg/m2  General Appearance: Pleasant, alert, WN/WD in no acute respiratory distress.  Head Exam: Normal. Normocephalic, atraumatic. No sinus tenderness.  Eye Exam: Normal external eye, conjunctiva, lids. ANTHONY.  Ear Exam: Normal auditory canals and external ears. Non-tender.  Left TM-normal. Right TM-normal.  OroPharynx Exam: Dental hygiene adequate. Normal buccal mucosa. Normal pharynx.  Chest/Respiratory Exam: Normal, comfortable, easy respirations. Chest wall normal. Lungs are clear to auscultation. No wheezing, crackles, or rhonchi.  Cardiovascular Exam: Regular rate and rhythm. No murmur, gallop, or rubs. No pedal edema.  Musculoskeletal Exam: Back is non-tender, full ROM of upper and lower extremities.  Skin: no rash, warm and dry.  Neurologic Exam: Nonfocal, no tremor. Normal gait.  Psychiatric Exam: Alert - appropriate, normal affect    ASSESSMENT/PLAN:    ICD-10-CM    1. Post-viral cough syndrome R05 mometasone (ASMANEX 30 METERED DOSES) 110 MCG/INH inhaler   2. Restless legs syndrome (RLS) G25.81 rOPINIRole (REQUIP) 1 MG tablet      Differential diagnosis of chronic cough includes asthma, postnasal drip, allergies, reflux postviral cough,     Start steroid inhaler    Increased dose of Requip    Follow up: Return as needed if symptoms fail to improve    Maxi Navarrete MD [facility]

## 2018-03-08 ENCOUNTER — HOSPITAL ENCOUNTER (OUTPATIENT)
Facility: CLINIC | Age: 58
Discharge: HOME | End: 2018-03-08
Attending: SPECIALIST | Admitting: SPECIALIST
Payer: COMMERCIAL

## 2018-03-08 VITALS
SYSTOLIC BLOOD PRESSURE: 115 MMHG | DIASTOLIC BLOOD PRESSURE: 73 MMHG | RESPIRATION RATE: 26 BRPM | OXYGEN SATURATION: 93 %

## 2018-03-08 DIAGNOSIS — K22.2 STRICTURE AND STENOSIS OF ESOPHAGUS: ICD-10-CM

## 2018-03-08 DIAGNOSIS — K21.00 GASTROESOPHAGEAL REFLUX DISEASE WITH ESOPHAGITIS: Primary | ICD-10-CM

## 2018-03-08 LAB — UPPER GI ENDOSCOPY: NORMAL

## 2018-03-08 PROCEDURE — 88305 TISSUE EXAM BY PATHOLOGIST: CPT | Performed by: SPECIALIST

## 2018-03-08 PROCEDURE — 43245 EGD DILATE STRICTURE: CPT | Performed by: SPECIALIST

## 2018-03-08 PROCEDURE — 25000125 ZZHC RX 250: Performed by: SPECIALIST

## 2018-03-08 PROCEDURE — 25000128 H RX IP 250 OP 636: Performed by: SPECIALIST

## 2018-03-08 PROCEDURE — G0500 MOD SEDAT ENDO SERVICE >5YRS: HCPCS | Performed by: SPECIALIST

## 2018-03-08 PROCEDURE — 43239 EGD BIOPSY SINGLE/MULTIPLE: CPT | Mod: XS | Performed by: SPECIALIST

## 2018-03-08 PROCEDURE — 88305 TISSUE EXAM BY PATHOLOGIST: CPT | Mod: 26 | Performed by: SPECIALIST

## 2018-03-08 RX ORDER — LIDOCAINE 40 MG/G
CREAM TOPICAL
Status: DISCONTINUED | OUTPATIENT
Start: 2018-03-08 | End: 2018-03-08 | Stop reason: HOSPADM

## 2018-03-08 RX ORDER — FENTANYL CITRATE 50 UG/ML
INJECTION, SOLUTION INTRAMUSCULAR; INTRAVENOUS PRN
Status: DISCONTINUED | OUTPATIENT
Start: 2018-03-08 | End: 2018-03-08 | Stop reason: HOSPADM

## 2018-03-08 RX ORDER — ONDANSETRON 2 MG/ML
INJECTION INTRAMUSCULAR; INTRAVENOUS PRN
Status: DISCONTINUED | OUTPATIENT
Start: 2018-03-08 | End: 2018-03-08 | Stop reason: HOSPADM

## 2018-03-08 RX ORDER — ONDANSETRON 2 MG/ML
4 INJECTION INTRAMUSCULAR; INTRAVENOUS
Status: DISCONTINUED | OUTPATIENT
Start: 2018-03-08 | End: 2018-03-08 | Stop reason: HOSPADM

## 2018-03-08 NOTE — H&P
Pre-Endoscopy History and Physical     Luis Felipe Weaver MRN# [id number]   YOB: 1960 Age: 58 year old     Date of Procedure: 3/8/2018  Primary care provider: Maxi Navarrete  Type of Endoscopy: Gastroscopy with possible biopsy, possible dilation  Reason for Procedure: dysphagia  Type of Anesthesia Anticipated: Conscious Sedation    HPI:    Luis Felipe is a 58 year old male who will be undergoing the above procedure.      A history and physical has been performed. The patient's medications and allergies have been reviewed. The risks and benefits of the procedure and the sedation options and risks were discussed with the patient.  All questions were answered and informed consent was obtained.      He denies a personal or family history of anesthesia complications or bleeding disorders.     Patient Active Problem List   Diagnosis     Restless legs syndrome (RLS)     GERD (gastroesophageal reflux disease)     Psoriasis     Narcolepsy without cataplexy(347.00)     Achalasia     Pain in thoracic spine        No past medical history on file.     Past Surgical History:   Procedure Laterality Date     COLONOSCOPY       NO         Social History   Substance Use Topics     Smoking status: Never Smoker     Smokeless tobacco: Never Used     Alcohol use No       Family History   Problem Relation Age of Onset     DIABETES Mother      Hypertension Mother      Obesity Mother      C.A.D. No family hx of      Cancer - colorectal No family hx of        Prior to Admission medications    Medication Sig Start Date End Date Taking? Authorizing Provider   pramipexole (MIRAPEX) 1 MG tablet Take 1 tablet (1 mg) by mouth nightly as needed 4/27/17  Yes Maxi Navarrete MD   mometasone (ASMANEX 30 METERED DOSES) 110 MCG/INH inhaler Inhale 1 puff into the lungs daily 3/2/18   Maxi Navarrete MD   rOPINIRole (REQUIP) 1 MG tablet Take 1 tablet (1 mg) by mouth At Bedtime 3/2/18   Maxi Navarrete MD   UNKNOWN  MED DOSAGE Topical psoriasis medication 1/11/12   Everton Cox MD       Allergies   Allergen Reactions     Provigil [Modafinil] Rash        REVIEW OF SYSTEMS:   5 point ROS negative except as noted above in HPI, including Gen., Resp., CV, GI &  system review.    PHYSICAL EXAM:   BP (!) 138/98  SpO2 96% Estimated body mass index is 27.46 kg/(m^2) as calculated from the following:    Height as of 3/2/18: 1.829 m (6').    Weight as of 3/2/18: 91.9 kg (202 lb 8 oz).   GENERAL APPEARANCE: alert, and oriented  MENTAL STATUS: alert  AIRWAY EXAM: Mallampatti Class II (visualization of the soft palate, fauces, and uvula)  RESP: lungs clear to auscultation - no rales, rhonchi or wheezes  CV: regular rates and rhythm  DIAGNOSTICS:    Not indicated    IMPRESSION   ASA Class 2 - Mild systemic disease    PLAN:   Plan for Esophagogastroduodenoscopy with possible biopsy, possible dilation, possible foreign body removal. We discussed the risks, benefits and alternatives and the patient wished to proceed.    The above has been forwarded to the consulting provider.      Signed Electronically by: Jose Alejandro Hall  March 8, 2018

## 2018-03-08 NOTE — BRIEF OP NOTE
[facility] Brief Operative Note    Pre-operative diagnosis: GERD   Post-operative diagnosis reflux esophagitis with stenosis and hiatus hernia     Procedure: Procedure(s):  EGD - Wound Class: II-Clean Contaminated   - Wound Class: II-Clean Contaminated   Surgeon(s): Surgeon(s) and Role:     * Jose Alejandro Hall MD - Primary   Estimated blood loss: * No values recorded between 3/8/2018 12:00 AM and 3/8/2018  1:53 PM *    Specimens:   ID Type Source Tests Collected by Time Destination   A :  Biopsy Gastro Esophageal Junction SURGICAL PATHOLOGY EXAM Jose Alejandro Hall MD 3/8/2018  1:35 PM       Findings: Please see ProVation procedure note in Chart Review

## 2018-03-09 LAB — COPATH REPORT: NORMAL

## 2018-03-12 ENCOUNTER — TELEPHONE (OUTPATIENT)
Dept: FAMILY MEDICINE | Facility: CLINIC | Age: 58
End: 2018-03-12

## 2018-03-12 DIAGNOSIS — G25.81 RESTLESS LEGS SYNDROME (RLS): ICD-10-CM

## 2018-03-12 DIAGNOSIS — R05.8 POST-VIRAL COUGH SYNDROME: ICD-10-CM

## 2018-03-12 RX ORDER — ROPINIROLE 1 MG/1
1 TABLET, FILM COATED ORAL AT BEDTIME
Qty: 30 TABLET | Refills: 11 | Status: SHIPPED | OUTPATIENT
Start: 2018-03-12 | End: 2018-05-03

## 2018-03-12 NOTE — TELEPHONE ENCOUNTER
Spoke with [facility].  3/2 Rx were sent to mail order.  Patient did not want filled at mail order and called them to cancel.    3/2 Rx sent to [facility] per patient's request.  Elsi Dunn RN

## 2018-03-12 NOTE — TELEPHONE ENCOUNTER
Reason for Call:  Medication or medication refill:    Do you use a [facility]?  Name of the pharmacy and phone number for the current request:      [facility] [address] [place], [place] - [address] AT  [address]    Name of the medication requested: The pharmacy lost all medication requests that were sent to the pharmacy on 3/2. The only one the pharmacy remembered was rOPINIRole (REQUIP) 1 MG tablet.   She thinks that there were more but is unsure.  They requested a rush on this because the pt has been out of the medication for a couple of weeks.      Other request:     Can we leave a detailed message on this number? YES    Phone number patient can be reached at: [phone]    Best Time: any    Call taken on 3/12/2018 at 1:35 PM by Antoinette Hernandez

## 2018-04-23 ENCOUNTER — MYC MEDICAL ADVICE (OUTPATIENT)
Dept: FAMILY MEDICINE | Facility: CLINIC | Age: 58
End: 2018-04-23

## 2018-05-03 ENCOUNTER — OFFICE VISIT (OUTPATIENT)
Dept: FAMILY MEDICINE | Facility: CLINIC | Age: 58
End: 2018-05-03
Payer: COMMERCIAL

## 2018-05-03 VITALS
TEMPERATURE: 97.6 F | BODY MASS INDEX: 27.73 KG/M2 | HEIGHT: 72 IN | DIASTOLIC BLOOD PRESSURE: 64 MMHG | HEART RATE: 73 BPM | OXYGEN SATURATION: 97 % | SYSTOLIC BLOOD PRESSURE: 101 MMHG | WEIGHT: 204.7 LBS

## 2018-05-03 DIAGNOSIS — K21.00 GASTROESOPHAGEAL REFLUX DISEASE WITH ESOPHAGITIS: ICD-10-CM

## 2018-05-03 DIAGNOSIS — S63.641A SPRAIN OF METACARPOPHALANGEAL (MCP) JOINT OF RIGHT THUMB, INITIAL ENCOUNTER: ICD-10-CM

## 2018-05-03 DIAGNOSIS — G25.81 RESTLESS LEGS SYNDROME (RLS): Primary | ICD-10-CM

## 2018-05-03 PROCEDURE — 99214 OFFICE O/P EST MOD 30 MIN: CPT | Performed by: FAMILY MEDICINE

## 2018-05-03 RX ORDER — ROPINIROLE 1 MG/1
2 TABLET, FILM COATED ORAL AT BEDTIME
Qty: 60 TABLET | Refills: 3 | Status: SHIPPED | OUTPATIENT
Start: 2018-05-03 | End: 2018-09-14

## 2018-05-03 NOTE — NURSING NOTE
Chief Complaint   Patient presents with     Medication Request     /64  Pulse 73  Temp 97.6  F (36.4  C) (Oral)  Ht 6' (1.829 m)  Wt 204 lb 11.2 oz (92.9 kg)  SpO2 97%  BMI 27.76 kg/m2 Estimated body mass index is 27.76 kg/(m^2) as calculated from the following:    Height as of this encounter: 6' (1.829 m).    Weight as of this encounter: 204 lb 11.2 oz (92.9 kg).  Medication Reconciliation: complete      Health Maintenance that is potentially due pending provider review:  NONE    n/a    JEANETTE Moulton

## 2018-05-03 NOTE — PROGRESS NOTES
SUBJECTIVE:   Luis Felipe Weaver is a 58 year old male who presents to clinic today for the following health issues:      Medication Followup of Requip 1mg    Taking Medication as prescribed: NO-taking 2mg a day - 1 during the day and 1 at night     Side Effects:  None    Medication Helping Symptoms:  yes      has restless leg syndrome and he was getting some nausea with his Mirapex so we tried switching him to Requip he thinks this is going okay but he feels like the dose could be better, we tried increasing it last time but now he wonders if he can go up to 2 mg  He would like to be able to alter the dose though so he would rather have pills that can be split or 2 1 mg tabs    He is also  lately noticed some improvement in his reflux he just recently had an upper GI endoscopy which showed some gastritis with esophagitis but no Torres's esophagus  We discussed this today    He also had a sprain of his right thumb and this is still swollen    ROS: As per HPI.  Constitutional: no fevers/sweats/chills  CV: no palpitations, no chest pain, no lower extremity swelling.  Resp: no shortness of breath, wheezing, or cough.      I have reviewed and updated the patient's past medical history in the EMR. Current problems are:    Patient Active Problem List   Diagnosis     Restless legs syndrome (RLS)     GERD (gastroesophageal reflux disease)     Psoriasis     Narcolepsy without cataplexy(347.00)     Achalasia     Pain in thoracic spine     Gastroesophageal reflux disease with esophagitis     Past Surgical History:   Procedure Laterality Date     COLONOSCOPY       ESOPHAGOSCOPY, GASTROSCOPY, DUODENOSCOPY (EGD), COMBINED N/A 3/8/2018    Procedure: COMBINED ESOPHAGOSCOPY, GASTROSCOPY, DUODENOSCOPY (EGD), BIOPSY SINGLE OR MULTIPLE;;  Surgeon: Jose Alejandro Hall MD;  Location:  GI     ESOPHAGOSCOPY, GASTROSCOPY, DUODENOSCOPY (EGD), DILATATION, COMBINED N/A 3/8/2018    Procedure: COMBINED ESOPHAGOSCOPY, GASTROSCOPY, DUODENOSCOPY  (EGD), DILATATION;  EGD;  Surgeon: Jose Alejandro Hall MD;  Location:  GI     NO         Social History   Substance Use Topics     Smoking status: Never Smoker     Smokeless tobacco: Never Used     Alcohol use No     Family History   Problem Relation Age of Onset     DIABETES Mother      Hypertension Mother      Obesity Mother      C.A.D. No family hx of      Cancer - colorectal No family hx of          Allergies, reviewed:     Allergies   Allergen Reactions     Provigil [Modafinil] Rash       Current Outpatient Prescriptions   Medication Sig Dispense Refill     rOPINIRole (REQUIP) 1 MG tablet Take 2 tablets (2 mg) by mouth At Bedtime 60 tablet 3     mometasone (ASMANEX 30 METERED DOSES) 110 MCG/INH inhaler Inhale 1 puff into the lungs daily 1 Inhaler 1     omeprazole (PRILOSEC) 20 MG CR capsule Take 1 capsule (20 mg) by mouth daily 30 capsule 1     pramipexole (MIRAPEX) 1 MG tablet Take 1 tablet (1 mg) by mouth nightly as needed 90 tablet 3     UNKNOWN MED DOSAGE Topical psoriasis medication       [DISCONTINUED] rOPINIRole (REQUIP) 1 MG tablet Take 1 tablet (1 mg) by mouth At Bedtime 30 tablet 11       Objective:  /64  Pulse 73  Temp 97.6  F (36.4  C) (Oral)  Ht 6' (1.829 m)  Wt 204 lb 11.2 oz (92.9 kg)  SpO2 97%  BMI 27.76 kg/m2  General Appearance: Pleasant, alert, WN/WD in no acute respiratory distress.  Musculoskeletal Exam: Tender base of the right thumb mildly swollen.  Skin: no rash, warm and dry.  Neurologic Exam: Nonfocal, no tremor. Normal gait.  Psychiatric Exam: Alert - appropriate, normal affect    ASSESSMENT/PLAN:    ICD-10-CM    1. Restless legs syndrome (RLS) G25.81 rOPINIRole (REQUIP) 1 MG tablet   2. Gastroesophageal reflux disease with esophagitis K21.0    3. Sprain of metacarpophalangeal (MCP) joint of right thumb, initial encounter S63.641A      Increased dose of Requip again, answered questions about the medications possible side effects and other possibilities of medications    He  is considering going back to the Mirapex if this does not work because he is unsure if it was really causing nausea or that was more the reflux    Overall this seems to be better and he has some plan follow-up with the gastroneurologist    Mild swelling of the base of the right thumb I think is likely a thumb sprain  I did not recommend x-rays today however I did recommend splinting and NSAIDs    Follow up: Return as needed if symptoms fail to improve    Maxi Navarrete MD MPH

## 2018-05-03 NOTE — MR AVS SNAPSHOT
After Visit Summary   5/3/2018    Luis Felipe Weaver    MRN: [id number]           Patient Information     Date Of Birth          1960        Visit Information        Provider Department      5/3/2018 12:15 PM Maxi Navarrete MD [facility]        Today's Diagnoses     Restless legs syndrome (RLS)    -  1    Gastroesophageal reflux disease with esophagitis        Sprain of metacarpophalangeal (MCP) joint of right thumb, initial encounter           Follow-ups after your visit        Follow-up notes from your care team     Return if symptoms worsen or fail to improve.      Who to contact     If you have questions or need follow up information about today's clinic visit or your schedule please contact [facility] directly at [phone].  Normal or non-critical lab and imaging results will be communicated to you by MyChart, letter or phone within 4 business days after the clinic has received the results. If you do not hear from us within 7 days, please contact the clinic through MyChart or phone. If you have a critical or abnormal lab result, we will notify you by phone as soon as possible.  Submit refill requests through [employer] or call your pharmacy and they will forward the refill request to us. Please allow 3 business days for your refill to be completed.          Additional Information About Your Visit        MyChart Information     [employer] gives you secure access to your electronic health record. If you see a primary care provider, you can also send messages to your care team and make appointments. If you have questions, please call your primary care clinic.  If you do not have a primary care provider, please call [phone] and they will assist you.        Care EveryWhere ID     This is your Care EveryWhere ID. This could be used by other organizations to access your [facility] medical records  [id number]        Your Vitals Were     Pulse Temperature Height Pulse  Oximetry BMI (Body Mass Index)       73 97.6  F (36.4  C) (Oral) 6' (1.829 m) 97% 27.76 kg/m2        Blood Pressure from Last 3 Encounters:   05/03/18 101/64   03/08/18 115/73   03/02/18 116/68    Weight from Last 3 Encounters:   05/03/18 204 lb 11.2 oz (92.9 kg)   03/02/18 202 lb 8 oz (91.9 kg)   02/12/18 206 lb (93.4 kg)              Today, you had the following     No orders found for display         Today's Medication Changes          These changes are accurate as of 5/3/18  3:07 PM.  If you have any questions, ask your nurse or doctor.               These medicines have changed or have updated prescriptions.        Dose/Directions    rOPINIRole 1 MG tablet   Commonly known as:  REQUIP   This may have changed:  how much to take   Used for:  Restless legs syndrome (RLS)   Changed by:  Maxi Navarrete MD        Dose:  2 mg   Take 2 tablets (2 mg) by mouth At Bedtime   Quantity:  60 tablet   Refills:  3            Where to get your medicines      [facility] [address] [place] [address] [address] [facility] [address]  [address] [facility] [address]     Phone:  [phone]     rOPINIRole 1 MG tablet                Primary Care Provider Office Phone # Fax #    Maxi Navarrete [phone] [address] [facility] [address]        Equal Access to Services     SUSHIL MONACO : Hadii edmond paceo Sorosalind, waaxda luqadaha, qaybta kaalmada adeegyada, dutch idiphilipp suarez. So [facility] [phone].    ATENCIÓN: Si habla español, tiene a recinos disposición servicios gratuitos de asistencia lingüística. Llame al [phone].    We comply with applicable federal civil rights laws and [place] laws. We do not discriminate on the basis of race, color, national origin, age, disability, sex, sexual orientation, or gender identity.            Thank you!     Thank you for choosing [facility]  for  your care. Our goal is always to provide you with excellent care. Hearing back from our patients is one way we can continue to improve our services. Please take a few minutes to complete the written survey that you may receive in the mail after your visit with us. Thank you!             Your Updated Medication List - Protect others around you: Learn how to safely use, store and throw away your medicines at www.disposemymeds.org.          This list is accurate as of 5/3/18  3:07 PM.  Always use your most recent med list.                   Brand Name Dispense Instructions for use Diagnosis    mometasone 110 MCG/INH inhaler    ASMANEX 30 METERED DOSES    1 Inhaler    Inhale 1 puff into the lungs daily    Post-viral cough syndrome       omeprazole 20 MG CR capsule    priLOSEC    30 capsule    Take 1 capsule (20 mg) by mouth daily    Gastroesophageal reflux disease with esophagitis, Stricture and stenosis of esophagus       pramipexole 1 MG tablet    MIRAPEX    90 tablet    Take 1 tablet (1 mg) by mouth nightly as needed    Restless legs syndrome (RLS)       rOPINIRole 1 MG tablet    REQUIP    60 tablet    Take 2 tablets (2 mg) by mouth At Bedtime    Restless legs syndrome (RLS)       UNKNOWN MED DOSAGE      Topical psoriasis medication

## 2018-09-14 DIAGNOSIS — G25.81 RESTLESS LEGS SYNDROME (RLS): ICD-10-CM

## 2018-09-14 NOTE — TELEPHONE ENCOUNTER
"Dose increased 5/3/2018  Sent MyChart to pt to see if increase effective, side effects, etc  Rozina AVERY RN    Requested Prescriptions   Pending Prescriptions Disp Refills     rOPINIRole (REQUIP) 1 MG tablet [Pharmacy Med Name: ROPINIROLE 1MG TABLETS] 120 tablet 0     Sig: TAKE 2 TABLETS BY MOUTH AT BEDTIME    Antiparkinson's Agents Protocol Failed    9/14/2018  8:07 AM       Failed - ALT on record in past 12 months        No lab results found.         Failed - Serum Creatinine on file in past 12 months    No lab results found.         Passed - Blood pressure under 140/90 in past 12 months    BP Readings from Last 3 Encounters:   05/03/18 101/64   03/08/18 115/73   03/02/18 116/68                Passed - CBC on record in past 12 months    Recent Labs   Lab Test  02/12/18   1316   WBC  6.0   RBC  4.93   HGB  15.0   HCT  43.9   PLT  199       For [facility] ONLY: [id number] = WBC, [id number] = RBC         Passed - Patient is age 18 or older       Passed - Recent (6 mo) or future (30 days) visit within the authorizing provider's specialty    Patient had office visit in the last 6 months or has a visit in the next 30 days with authorizing provider or within the authorizing provider's specialty.  See \"Patient Info\" tab in inbasket, or \"Choose Columns\" in Meds & Orders section of the refill encounter.                "

## 2018-09-18 NOTE — TELEPHONE ENCOUNTER
JF,   Left  for patient to callback  See below messages  Pt did not read MyChart sent to him either  No response from patient to our outreach  Please advise on further refill  Thanks,  Rozina AVERY RN

## 2018-09-20 RX ORDER — ROPINIROLE 1 MG/1
TABLET, FILM COATED ORAL
Qty: 120 TABLET | Refills: 0 | Status: SHIPPED | OUTPATIENT
Start: 2018-09-20 | End: 2019-11-22

## 2018-12-28 ENCOUNTER — TRANSFERRED RECORDS (OUTPATIENT)
Dept: HEALTH INFORMATION MANAGEMENT | Facility: CLINIC | Age: 58
End: 2018-12-28

## 2019-02-01 ENCOUNTER — OFFICE VISIT (OUTPATIENT)
Dept: FAMILY MEDICINE | Facility: CLINIC | Age: 59
End: 2019-02-01
Payer: COMMERCIAL

## 2019-02-01 VITALS
SYSTOLIC BLOOD PRESSURE: 108 MMHG | OXYGEN SATURATION: 97 % | TEMPERATURE: 98.9 F | HEART RATE: 67 BPM | WEIGHT: 203.31 LBS | DIASTOLIC BLOOD PRESSURE: 68 MMHG | BODY MASS INDEX: 27.57 KG/M2

## 2019-02-01 DIAGNOSIS — L82.0 INFLAMED SEBORRHEIC KERATOSIS: ICD-10-CM

## 2019-02-01 DIAGNOSIS — G25.81 RESTLESS LEGS SYNDROME (RLS): ICD-10-CM

## 2019-02-01 DIAGNOSIS — L40.9 PSORIASIS: Primary | ICD-10-CM

## 2019-02-01 PROCEDURE — 99214 OFFICE O/P EST MOD 30 MIN: CPT | Performed by: FAMILY MEDICINE

## 2019-02-01 RX ORDER — FLUOCINOLONE ACETONIDE 0.11 MG/ML
OIL TOPICAL 2 TIMES DAILY
Qty: 1 BOTTLE | Refills: 3 | Status: SHIPPED | OUTPATIENT
Start: 2019-02-01 | End: 2020-02-01

## 2019-02-01 ASSESSMENT — PAIN SCALES - GENERAL: PAINLEVEL: NO PAIN (0)

## 2019-02-01 NOTE — PROGRESS NOTES
SUBJECTIVE:   Luis Felipe Weaver is a 59 year old male who presents to clinic today for the following health issues:      Medication Followup of RLS Medication    Taking Medication as prescribed: yes    Side Effects:  Makes patient sick , patient is aware there is a patch for this dx, but the cost is $900 and is wondering if there, is anything else available     Medication Helping Symptoms:  No       Is going scuba diving and needs a form done because he takes Rx    Also some questins about facial lesions  We discussed possible removal but in the end he decided not to do it    Also some scalp dryness (Hx of psoriasis)      Current Outpatient Medications   Medication     fluocinolone acetonide (DERMA SMOOTHE/FS BODY) 0.01 % external oil     rOPINIRole (REQUIP) 1 MG tablet     UNKNOWN MED DOSAGE     No current facility-administered medications for this visit.      I have reviewed the patient's medical history in detail; there are no changes to the history as noted in EpicCare.    ROS: As per HPI.  Constitutional: no fevers/sweats/chills  CV: no chest pain  RESP: no shortness of breath/wheezing  GI: no nausea/vomiting/diarrhea  : no dysuria, normal urinary pattern    EXAM  /68   Pulse 67   Temp 98.9  F (37.2  C) (Tympanic)   Wt 92.2 kg (203 lb 5 oz)   SpO2 97%   BMI 27.57 kg/m    Gen: Healthy appearing male in no apparent distress.  Skin: Left face a mole and an SK  Also scaling of scalp  Heme: No bruising or petechiae  Lymph: No adenopathy    ASSESSMENT/PLAN:    ICD-10-CM    1. Psoriasis L40.9 fluocinolone acetonide (DERMA SMOOTHE/FS BODY) 0.01 % external oil   2. Restless legs syndrome (RLS) G25.81    3. Inflamed seborrheic keratosis L82.0        Discussed new Tx for psoriasis  Other possibilities for RLS, but the patch appears to be too expensive  SK may be removed at a later date    Counselled on medication choice, use, side effects of medications, nonprescription adjunct treatment and appropriate  follow up. Couns time: 15 minutes of 25 minutes total face to face time.    Maxi Navarrete MD MPH

## 2019-02-01 NOTE — NURSING NOTE
Chief Complaint   Patient presents with     Medication Reconciliation     RLS medication     Forms     /68   Pulse 67   Temp 98.9  F (37.2  C) (Tympanic)   Wt 92.2 kg (203 lb 5 oz)   SpO2 97%   BMI 27.57 kg/m   Estimated body mass index is 27.57 kg/m  as calculated from the following:    Height as of 5/3/18: 1.829 m (6').    Weight as of this encounter: 92.2 kg (203 lb 5 oz).  bp completed using cuff size: regular      Health Maintenance addressed:  NONE    n/a

## 2019-05-05 DIAGNOSIS — G25.81 RESTLESS LEGS SYNDROME (RLS): ICD-10-CM

## 2019-05-06 RX ORDER — ROPINIROLE 1 MG/1
TABLET, FILM COATED ORAL
Qty: 60 TABLET | Refills: 0 | Status: SHIPPED | OUTPATIENT
Start: 2019-05-06 | End: 2019-06-19

## 2019-05-06 RX ORDER — ROPINIROLE 1 MG/1
TABLET, FILM COATED ORAL
Start: 2019-05-06

## 2019-05-06 NOTE — TELEPHONE ENCOUNTER
"Duplicate  Rozina AVERY RN    Requested Prescriptions   Pending Prescriptions Disp Refills     rOPINIRole (REQUIP) 1 MG tablet [Pharmacy Med Name: ROPINIROLE 1MG TABLETS] 120 tablet 0     Sig: TAKE 2 TABLETS BY MOUTH AT BEDTIME       Antiparkinson's Agents Protocol Failed - 5/5/2019 11:33 AM        Failed - CBC on record in past 12 months     Recent Labs   Lab Test 02/12/18  1316   WBC 6.0   RBC 4.93   HGB 15.0   HCT 43.9                    Failed - ALT on record in past 12 months         No lab results found.          Failed - Serum Creatinine on file in past 12 months     No lab results found.          Passed - Blood pressure under 140/90 in past 12 months     BP Readings from Last 3 Encounters:   02/01/19 108/68   05/03/18 101/64   03/08/18 115/73                 Passed - Medication is active on med list        Passed - Patient is age 18 or older        Passed - Recent (6 mo) or future (30 days) visit within the authorizing provider's specialty     Patient had office visit in the last 6 months or has a visit in the next 30 days with authorizing provider or within the authorizing provider's specialty.  See \"Patient Info\" tab in inbasket, or \"Choose Columns\" in Meds & Orders section of the refill encounter.              "

## 2019-05-06 NOTE — TELEPHONE ENCOUNTER
"Routing refill request to provider for review/approval because:  Labs not current:  CBC, Creatinine, and ALT needed yearly  Rozina AVERY RN    Last Written Prescription Date:  9/20/2018  Last Fill Quantity: 120,  # refills: 0   Last office visit: 2/1/2019 with prescribing provider:     Future Office Visit:    Requested Prescriptions   Pending Prescriptions Disp Refills     rOPINIRole (REQUIP) 1 MG tablet [Pharmacy Med Name: ROPINIROLE 1MG TABLETS] 60 tablet 0     Sig: TAKE 2 TABLETS(2 MG) BY MOUTH AT BEDTIME       Antiparkinson's Agents Protocol Failed - 5/5/2019 11:12 AM        Failed - CBC on record in past 12 months     Recent Labs   Lab Test 02/12/18  1316   WBC 6.0   RBC 4.93   HGB 15.0   HCT 43.9                    Failed - ALT on record in past 12 months         No lab results found.          Failed - Serum Creatinine on file in past 12 months     No lab results found.          Passed - Blood pressure under 140/90 in past 12 months     BP Readings from Last 3 Encounters:   02/01/19 108/68   05/03/18 101/64   03/08/18 115/73                 Passed - Medication is active on med list        Passed - Patient is age 18 or older        Passed - Recent (6 mo) or future (30 days) visit within the authorizing provider's specialty     Patient had office visit in the last 6 months or has a visit in the next 30 days with authorizing provider or within the authorizing provider's specialty.  See \"Patient Info\" tab in inbasket, or \"Choose Columns\" in Meds & Orders section of the refill encounter.              "

## 2019-06-19 DIAGNOSIS — G25.81 RESTLESS LEGS SYNDROME (RLS): ICD-10-CM

## 2019-06-20 RX ORDER — ROPINIROLE 1 MG/1
TABLET, FILM COATED ORAL
Qty: 60 TABLET | Refills: 0 | Status: SHIPPED | OUTPATIENT
Start: 2019-06-20 | End: 2019-07-29

## 2019-06-20 NOTE — TELEPHONE ENCOUNTER
"Routing refill request to provider for review/approval because:  Labs not current:  ALT, Creatinine, CBC needed yearly  Rozina AVERY RN    Last Written Prescription Date:  5/6/2019  Last Fill Quantity: 60,  # refills: 0   Last office visit: 2/1/2019 with prescribing provider:     Future Office Visit:    Requested Prescriptions   Pending Prescriptions Disp Refills     rOPINIRole (REQUIP) 1 MG tablet [Pharmacy Med Name: ROPINIROLE 1MG TABLETS] 60 tablet 0     Sig: TAKE 2 TABLETS(2 MG) BY MOUTH AT BEDTIME       Antiparkinson's Agents Protocol Failed - 6/19/2019  4:34 PM        Failed - CBC on record in past 12 months     Recent Labs   Lab Test 02/12/18  1316   WBC 6.0   RBC 4.93   HGB 15.0   HCT 43.9                    Failed - ALT on record in past 12 months         No lab results found.          Failed - Serum Creatinine on file in past 12 months     No lab results found.          Passed - Blood pressure under 140/90 in past 12 months     BP Readings from Last 3 Encounters:   02/01/19 108/68   05/03/18 101/64   03/08/18 115/73                 Passed - Medication is active on med list        Passed - Patient is age 18 or older        Passed - Recent (6 mo) or future (30 days) visit within the authorizing provider's specialty     Patient had office visit in the last 6 months or has a visit in the next 30 days with authorizing provider or within the authorizing provider's specialty.  See \"Patient Info\" tab in inbasket, or \"Choose Columns\" in Meds & Orders section of the refill encounter.              "

## 2019-07-29 DIAGNOSIS — G25.81 RESTLESS LEGS SYNDROME (RLS): ICD-10-CM

## 2019-07-30 RX ORDER — ROPINIROLE 1 MG/1
TABLET, FILM COATED ORAL
Qty: 120 TABLET | Refills: 0 | Status: SHIPPED | OUTPATIENT
Start: 2019-07-30 | End: 2019-11-20

## 2019-07-30 NOTE — TELEPHONE ENCOUNTER
"Routing refill request to provider for review/approval because:  Labs not current:  ALT, Creatinine, and CBC needed yearly  Rozina AVERY RN    Last Written Prescription Date:  6/20/2019  Last Fill Quantity: 60,  # refills: 0   Last office visit: 2/1/2019 with prescribing provider:     Future Office Visit:    Requested Prescriptions   Pending Prescriptions Disp Refills     rOPINIRole (REQUIP) 1 MG tablet [Pharmacy Med Name: ROPINIROLE 1MG TABLETS] 120 tablet 0     Sig: TAKE 2 TABLETS BY MOUTH AT BEDTIME       Antiparkinson's Agents Protocol Failed - 7/30/2019  1:32 PM        Failed - CBC on record in past 12 months     Recent Labs   Lab Test 02/12/18  1316   WBC 6.0   RBC 4.93   HGB 15.0   HCT 43.9                    Failed - ALT on record in past 12 months         No lab results found.          Failed - Serum Creatinine on file in past 12 months     No lab results found.          Passed - Blood pressure under 140/90 in past 12 months     BP Readings from Last 3 Encounters:   02/01/19 108/68   05/03/18 101/64   03/08/18 115/73                 Passed - Medication is active on med list        Passed - Patient is age 18 or older        Passed - Recent (6 mo) or future (30 days) visit within the authorizing provider's specialty     Patient had office visit in the last 6 months or has a visit in the next 30 days with authorizing provider or within the authorizing provider's specialty.  See \"Patient Info\" tab in inbasket, or \"Choose Columns\" in Meds & Orders section of the refill encounter.              "

## 2019-11-20 DIAGNOSIS — G25.81 RESTLESS LEGS SYNDROME (RLS): ICD-10-CM

## 2019-11-21 DIAGNOSIS — G25.81 RESTLESS LEGS SYNDROME (RLS): ICD-10-CM

## 2019-11-21 RX ORDER — ROPINIROLE 1 MG/1
TABLET, FILM COATED ORAL
Qty: 120 TABLET | Refills: 0 | Status: SHIPPED | OUTPATIENT
Start: 2019-11-21 | End: 2020-01-13

## 2019-11-21 NOTE — TELEPHONE ENCOUNTER
"ROPINIROLE 1MG TABLETS  Last Written Prescription Date:  07/30/2019  Last Fill Quantity: 120,  # refills: 0   Last office visit: 2/1/2019 with prescribing provider:  JULY   Future Office Visit:  Nothing at this time scheduled.    Requested Prescriptions   Pending Prescriptions Disp Refills     rOPINIRole (REQUIP) 1 MG tablet [Pharmacy Med Name: ROPINIROLE 1MG TABLETS] 120 tablet 0     Sig: TAKE 2 TABLETS BY MOUTH AT BEDTIME       Antiparkinson's Agents Protocol Failed - 11/21/2019  8:01 AM        Failed - CBC on record in past 12 months     Recent Labs   Lab Test 02/12/18  1316   WBC 6.0   RBC 4.93   HGB 15.0   HCT 43.9                    Failed - ALT on record in past 12 months         No lab results found.          Failed - Serum Creatinine on file in past 12 months     No lab results found.          Failed - Recent (6 mo) or future (30 days) visit within the authorizing provider's specialty     Patient had office visit in the last 6 months or has a visit in the next 30 days with authorizing provider or within the authorizing provider's specialty.  See \"Patient Info\" tab in inbasket, or \"Choose Columns\" in Meds & Orders section of the refill encounter.            Passed - Blood pressure under 140/90 in past 12 months     BP Readings from Last 3 Encounters:   02/01/19 108/68   05/03/18 101/64   03/08/18 115/73                 Passed - Medication is active on med list        Passed - Patient is age 18 or older        "

## 2019-11-21 NOTE — TELEPHONE ENCOUNTER
Routing refill request to provider for review/approval because:  Labs not current:  CBC, creatine, and ALT needed yearly  Rozina AVERY RN

## 2019-11-21 NOTE — TELEPHONE ENCOUNTER
"ROPINIROLE 1MG TABLETS  Last Written Prescription Date:  07/30/2019  Last Fill Quantity: 120,  # refills: 0   Last office visit: 2/1/2019 with prescribing provider:  JULY   Future Office Visit:  Nothing at this time scheduled.    Requested Prescriptions   Pending Prescriptions Disp Refills     rOPINIRole (REQUIP) 1 MG tablet [Pharmacy Med Name: ROPINIROLE 1MG TABLETS] 120 tablet 0     Sig: TAKE 2 TABLETS BY MOUTH AT BEDTIME       Antiparkinson's Agents Protocol Failed - 11/20/2019  7:01 PM        Failed - CBC on record in past 12 months     Recent Labs   Lab Test 02/12/18  1316   WBC 6.0   RBC 4.93   HGB 15.0   HCT 43.9                    Failed - ALT on record in past 12 months         No lab results found.          Failed - Serum Creatinine on file in past 12 months     No lab results found.          Failed - Recent (6 mo) or future (30 days) visit within the authorizing provider's specialty     Patient had office visit in the last 6 months or has a visit in the next 30 days with authorizing provider or within the authorizing provider's specialty.  See \"Patient Info\" tab in inbasket, or \"Choose Columns\" in Meds & Orders section of the refill encounter.            Passed - Blood pressure under 140/90 in past 12 months     BP Readings from Last 3 Encounters:   02/01/19 108/68   05/03/18 101/64   03/08/18 115/73                 Passed - Medication is active on med list        Passed - Patient is age 18 or older        "

## 2019-11-22 RX ORDER — ROPINIROLE 1 MG/1
TABLET, FILM COATED ORAL
Start: 2019-11-22

## 2020-01-11 DIAGNOSIS — G25.81 RESTLESS LEGS SYNDROME (RLS): ICD-10-CM

## 2020-01-13 RX ORDER — ROPINIROLE 1 MG/1
TABLET, FILM COATED ORAL
Qty: 60 TABLET | Refills: 0 | Status: SHIPPED | OUTPATIENT
Start: 2020-01-13 | End: 2020-04-15

## 2020-01-13 NOTE — TELEPHONE ENCOUNTER
"Requip 1MG  Last Written Prescription Date:  11/21/2019  Last Fill Quantity: 120,  # refills: 0   Last office visit: 2/1/2019 with prescribing provider:  Yes    Future Office Visit:      Requested Prescriptions   Pending Prescriptions Disp Refills     rOPINIRole (REQUIP) 1 MG tablet [Pharmacy Med Name: ROPINIROLE 1MG TABLETS] 120 tablet 0     Sig: TAKE 2 TABLETS BY MOUTH AT BEDTIME       Antiparkinson's Agents Protocol Failed - 1/11/2020  8:07 AM        Failed - CBC on record in past 12 months     Recent Labs   Lab Test 02/12/18  1316   WBC 6.0   RBC 4.93   HGB 15.0   HCT 43.9                    Failed - ALT on record in past 12 months         No lab results found.          Failed - Serum Creatinine on file in past 12 months     No lab results found.          Failed - Recent (6 mo) or future (30 days) visit within the authorizing provider's specialty     Patient had office visit in the last 6 months or has a visit in the next 30 days with authorizing provider or within the authorizing provider's specialty.  See \"Patient Info\" tab in inbasket, or \"Choose Columns\" in Meds & Orders section of the refill encounter.            Passed - Blood pressure under 140/90 in past 12 months     BP Readings from Last 3 Encounters:   02/01/19 108/68   05/03/18 101/64   03/08/18 115/73                 Passed - Medication is active on med list        Passed - Patient is age 18 or older          "

## 2020-01-13 NOTE — TELEPHONE ENCOUNTER
Medication is being filled for 1 time refill only due to:  Patient needs to be seen because it has been more than one year since last visit.   Due for physical and labs.  Last 2/12/18    Last OV 2/1/19  [facility] message sent to patient asking him to schedule an appointment.  Elsi Dunn RN

## 2020-04-15 DIAGNOSIS — G25.81 RESTLESS LEGS SYNDROME (RLS): ICD-10-CM

## 2020-04-15 NOTE — TELEPHONE ENCOUNTER
One month supply sent 1/13/'2020  Due for OV.  Last 2/1/19    [facility] sent last refill.  Patient has not read message.    VM left asking patient to call back.  Elsi Dunn RN

## 2020-04-15 NOTE — TELEPHONE ENCOUNTER
"Ropinirole  Last Written Prescription Date:  01/13/2020  Last Fill Quantity: 60,  # refills: 0   Last office visit: 2/1/2019 with prescribing provider:  yes   Future Office Visit:      Requested Prescriptions   Pending Prescriptions Disp Refills     rOPINIRole (REQUIP) 1 MG tablet 60 tablet 0     Sig: TAKE 2 TABLETS BY MOUTH AT BEDTIME       Antiparkinson's Agents Protocol Failed - 4/15/2020  9:06 AM        Failed - Blood pressure under 140/90 in past 12 months     BP Readings from Last 3 Encounters:   02/01/19 108/68   05/03/18 101/64   03/08/18 115/73                 Failed - CBC on record in past 12 months     Recent Labs   Lab Test 02/12/18  1316   WBC 6.0   RBC 4.93   HGB 15.0   HCT 43.9                    Failed - ALT on record in past 12 months         No lab results found.          Failed - Serum Creatinine on file in past 12 months     No lab results found.    Ok to refill medication if creatinine is low          Failed - Recent (6 mo) or future (30 days) visit within the authorizing provider's specialty     Patient had office visit in the last 6 months or has a visit in the next 30 days with authorizing provider or within the authorizing provider's specialty.  See \"Patient Info\" tab in inbasket, or \"Choose Columns\" in Meds & Orders section of the refill encounter.            Passed - Medication is active on med list        Passed - Patient is age 18 or older           "

## 2020-04-17 RX ORDER — ROPINIROLE 1 MG/1
TABLET, FILM COATED ORAL
Qty: 60 TABLET | Refills: 0 | Status: SHIPPED | OUTPATIENT
Start: 2020-04-17

## 2023-03-18 NOTE — TELEPHONE ENCOUNTER
JF,   Please advise on refill at today's appt  Thanks,  Rozina AVERY RN         both UE/LE grossly 4/5

## 2024-04-29 ENCOUNTER — TELEPHONE (OUTPATIENT)
Dept: FAMILY MEDICINE | Facility: CLINIC | Age: 64
End: 2024-04-29
Payer: COMMERCIAL

## 2024-04-29 NOTE — TELEPHONE ENCOUNTER
Reason for Call:  Appointment Request    Patient requesting this type of appt:  Preventive     Requested provider:  Dr. Joel Wegener    Reason patient unable to be scheduled: Not within requested timeframe    When does patient want to be seen/preferred time:  Any    Comments: Pt was wanting to be fit in for an \[facility] [place] [facility]\"" care- Annual preventative.     Could we send this information to you in [employer] or would you prefer to receive a phone call?:   No preference   Okay to leave a detailed message?: Yes at Cell number on file:    Telephone Information:   Mobile [phone]       Call taken on 4/29/2024 at 11:42 AM by Marylou Palomo

## (undated) RX ORDER — FENTANYL CITRATE 50 UG/ML
INJECTION, SOLUTION INTRAMUSCULAR; INTRAVENOUS
Status: DISPENSED
Start: 2018-03-08

## (undated) RX ORDER — ONDANSETRON 2 MG/ML
INJECTION INTRAMUSCULAR; INTRAVENOUS
Status: DISPENSED
Start: 2018-03-08